# Patient Record
Sex: FEMALE | HISPANIC OR LATINO | Employment: FULL TIME | URBAN - METROPOLITAN AREA
[De-identification: names, ages, dates, MRNs, and addresses within clinical notes are randomized per-mention and may not be internally consistent; named-entity substitution may affect disease eponyms.]

---

## 2020-12-07 ENCOUNTER — TELEPHONE (OUTPATIENT)
Dept: OBSTETRICS AND GYNECOLOGY | Facility: CLINIC | Age: 20
End: 2020-12-07

## 2020-12-07 NOTE — TELEPHONE ENCOUNTER
Pt states that she has a Nexplanon in her arm, and she has been bleeding for 20 days straight.  She states that she is going to [place] in a few weeks and will not be back until January. She would like to know if she could be seen before she goes and would like to see you.  Dr. Aly Rutledge is referring physician.

## 2020-12-17 ENCOUNTER — OFFICE VISIT (OUTPATIENT)
Dept: OBSTETRICS AND GYNECOLOGY | Facility: CLINIC | Age: 20
End: 2020-12-17

## 2020-12-17 VITALS
WEIGHT: 182 LBS | HEIGHT: 65 IN | BODY MASS INDEX: 30.32 KG/M2 | DIASTOLIC BLOOD PRESSURE: 62 MMHG | SYSTOLIC BLOOD PRESSURE: 100 MMHG

## 2020-12-17 DIAGNOSIS — Z97.5 BREAKTHROUGH BLEEDING ON NEXPLANON: Primary | ICD-10-CM

## 2020-12-17 DIAGNOSIS — N92.1 BREAKTHROUGH BLEEDING ON NEXPLANON: Primary | ICD-10-CM

## 2020-12-17 PROCEDURE — 99203 OFFICE O/P NEW LOW 30 MIN: CPT | Performed by: STUDENT IN AN ORGANIZED HEALTH CARE EDUCATION/TRAINING PROGRAM

## 2020-12-17 RX ORDER — NORGESTIMATE AND ETHINYL ESTRADIOL 0.25-0.035
1 KIT ORAL DAILY
Qty: 56 TABLET | Refills: 0 | Status: SHIPPED | OUTPATIENT
Start: 2020-12-17 | End: 2021-05-20

## 2020-12-17 NOTE — PROGRESS NOTES
"Chief Complaint   Patient presents with   • Gynecologic Exam     bleeding with nexplanon         SUBJECTIVE:     Katherin Peña is a 20 y.o. No obstetric history on file. who presents with prolonged bleeding with Nexplanon in place. She reports that since placement of the Nexplanon in June or July 2019, she has been experienced prolonged bleeding with her period that last 20 days at a time every month. She reports that she most recently started last Saturday. Prior to the Nexplanon, she had regular menses every 28 days, lasting 4-6 day since menarche at age 9. She is currently sexually active with one male partner. She is about to travel to [place] for a month and would like to have treatment prior to leaving.       History reviewed. No pertinent past medical history.   History reviewed. No pertinent surgical history.   Social History     Tobacco Use   • Smoking status: Never Smoker   Substance Use Topics   • Alcohol use: Yes   • Drug use: Not Currently     OB History   No obstetric history on file.        Review of Systems   Gastrointestinal: Negative for abdominal pain, nausea and vomiting.   Genitourinary: Positive for menstrual problem and vaginal bleeding.       OBJECTIVE:   Vitals:    12/17/20 1032   BP: 100/62   Weight: 82.6 kg (182 lb)   Height: 165.1 cm (65\")        Physical Exam  Vitals signs reviewed.   Constitutional:       Appearance: Normal appearance. She is normal weight.   HENT:      Head: Normocephalic and atraumatic.      Right Ear: External ear normal.      Left Ear: External ear normal.   Eyes:      Extraocular Movements: Extraocular movements intact.      Pupils: Pupils are equal, round, and reactive to light.   Neck:      Musculoskeletal: Normal range of motion and neck supple.   Pulmonary:      Effort: Pulmonary effort is normal. No respiratory distress.   Musculoskeletal: Normal range of motion.         General: No deformity.   Skin:     General: Skin is warm and dry.   Neurological:      " General: No focal deficit present.      Mental Status: She is alert and oriented to person, place, and time.   Psychiatric:         Mood and Affect: Mood normal.         Behavior: Behavior normal.         ASSESSMENT:     ICD-10-CM ICD-9-CM   1. Breakthrough bleeding on Nexplanon  N92.1 626.6    Z97.5 V45.59       PLAN:   We discussed that breakthrough and irregular bleeding on the Nexplanon is a common side effect. We discussed options for management of her bleeding which included trial of OCPs versus removal. Patient desires a trial of OCPs. I explained that the OCPs will be used to reset the endometrial lining to hopefully prevent the prolonged bleeding the patient is experiencing. I provided the patient with 2 month supply of birth control and demonstrated use using a diagram of pills to show differences between hormonally active pills and placebo pills. Patient is to take the second pack of pills if she has a withdrawal bleed that is beyond 7 days. Will have patient follow up in 2 months for annual exam and see if bleeding has improved. I reviewed risks of oral contraceptive pills including nausea, vomiting, rash, blood clots such as a venous thromboembolism, hypertension, and in rare cases stroke.  She was counseled that irregular bleeding may occur on pills, and if prolonged, heavy or otherwise bothersome, she should contact her provider immediately.  She was counseled that she should seek immediate medical attention with any sign of VTE. She indicated understanding and all questions and concerns answered. Patient to contact clinic regarding concerning side effects.      used during encounter.        Return in about 2 months (around 2/17/2021) for Annual physical.    Mercedez Guerrero MD  12/17/2020  13:04 EST

## 2021-03-04 ENCOUNTER — OFFICE VISIT (OUTPATIENT)
Dept: OBSTETRICS AND GYNECOLOGY | Facility: CLINIC | Age: 21
End: 2021-03-04

## 2021-03-04 VITALS
SYSTOLIC BLOOD PRESSURE: 100 MMHG | WEIGHT: 196 LBS | HEIGHT: 65 IN | DIASTOLIC BLOOD PRESSURE: 66 MMHG | BODY MASS INDEX: 32.65 KG/M2

## 2021-03-04 DIAGNOSIS — Z11.3 SCREENING EXAMINATION FOR STD (SEXUALLY TRANSMITTED DISEASE): ICD-10-CM

## 2021-03-04 DIAGNOSIS — Z97.5 NEXPLANON IN PLACE: ICD-10-CM

## 2021-03-04 DIAGNOSIS — Z01.419 WELL WOMAN EXAM: Primary | ICD-10-CM

## 2021-03-04 DIAGNOSIS — Z12.4 CERVICAL CANCER SCREENING: ICD-10-CM

## 2021-03-04 PROCEDURE — 99395 PREV VISIT EST AGE 18-39: CPT | Performed by: STUDENT IN AN ORGANIZED HEALTH CARE EDUCATION/TRAINING PROGRAM

## 2021-03-04 NOTE — PROGRESS NOTES
"GYN Annual Exam     CC- Here for annual exam.     Katherin Peña is a 21 y.o. female who presents for annual well woman exam. Patient has Nexplanon in place. She has had breakthrough bleeding with the Nexplanon in December that has improved with OCP use x 2 months. No intermenstrual bleeding, spotting, or discharge. She has never underwent a pap smear or pelvic exam before.     OB History    No obstetric history on file.       2/8-2/14  Sexually active with one male.   Current contraception: Nexplanon in place since 07/2019  History of abnormal Pap smear: n/a  Family history of uterine, colon or ovarian cancer: no  History of abnormal mammogram: n/a  Family history of breast cancer: yes- paternal aunt diagnosed with breast cancer at 45.   Last Pap : n/a    History reviewed. No pertinent past medical history.    History reviewed. No pertinent surgical history.      Current Outpatient Medications:   •  norgestimate-ethinyl estradiol (ORTHO-CYCLEN) 0.25-35 MG-MCG per tablet, Take 1 tablet by mouth Daily., Disp: 56 tablet, Rfl: 0    No Known Allergies    Social History     Tobacco Use   • Smoking status: Never Smoker   Substance Use Topics   • Alcohol use: Yes   • Drug use: Not Currently       History reviewed. No pertinent family history.    Review of Systems   All other systems reviewed and are negative.      Ht 165.1 cm (65\")   Wt 88.9 kg (196 lb)   BMI 32.62 kg/m²     Physical Exam  Vitals reviewed. Exam conducted with a chaperone present.   Constitutional:       Appearance: Normal appearance. She is obese.   HENT:      Head: Normocephalic and atraumatic.      Right Ear: External ear normal.      Left Ear: External ear normal.   Eyes:      Extraocular Movements: Extraocular movements intact.      Pupils: Pupils are equal, round, and reactive to light.   Cardiovascular:      Rate and Rhythm: Normal rate and regular rhythm.   Pulmonary:      Effort: Pulmonary effort is normal. No respiratory distress.   Chest:      " Breasts: Breasts are symmetrical.         Right: Normal. No swelling, bleeding, inverted nipple, mass, nipple discharge, skin change or tenderness.         Left: Normal. No swelling, bleeding, inverted nipple, mass, nipple discharge, skin change or tenderness.   Abdominal:      General: There is no distension.      Palpations: Abdomen is soft. There is no mass.      Tenderness: There is no abdominal tenderness. There is no guarding.      Hernia: No hernia is present.   Genitourinary:     General: Normal vulva.      Exam position: Lithotomy position.      Labia:         Right: No rash, tenderness, lesion or injury.         Left: No rash, tenderness, lesion or injury.       Urethra: No prolapse or urethral swelling.      Vagina: Normal. No vaginal discharge, erythema, tenderness, bleeding or lesions.      Cervix: Normal.      Uterus: Normal. Not enlarged, not fixed and not tender.       Adnexa: Right adnexa normal and left adnexa normal.        Right: No mass, tenderness or fullness.          Left: No mass, tenderness or fullness.     Musculoskeletal:         General: No deformity. Normal range of motion.      Cervical back: Normal range of motion and neck supple.      Comments: Nexplanon palpated in left arm.    Lymphadenopathy:      Upper Body:      Right upper body: No axillary adenopathy.      Left upper body: No axillary adenopathy.      Lower Body: No right inguinal adenopathy. No left inguinal adenopathy.   Skin:     General: Skin is warm and dry.   Neurological:      General: No focal deficit present.      Mental Status: She is alert and oriented to person, place, and time.   Psychiatric:         Mood and Affect: Mood normal.         Behavior: Behavior normal.       Assessment     1) GYN annual well woman exam.   2) Cervical cancer screening   3) STD screening   4) Nexplanon in place      Plan     1) Breast Health - Clinical breast exam yearly, Self breast awareness monthly  2) Pap - Pap smear collected today.  I discussed that if this returns normal, she will need repeat pap smear in 3 years per ASCCP guidelines.    3) Smoking status- non-smoker   4) Activity recommends - Adult 150-300 min/week of multi-component physical activities that include balance training, aerobic and physical strengthening.    5) STD screening- GC/CT collected today for STD screening. Patient declined blood work.   6) Follow up prn and one year.       Mercedez Guerrero MD

## 2021-03-08 ENCOUNTER — TELEPHONE (OUTPATIENT)
Dept: OBSTETRICS AND GYNECOLOGY | Facility: CLINIC | Age: 21
End: 2021-03-08

## 2021-03-08 LAB
C TRACH RRNA CVX QL NAA+PROBE: NEGATIVE
CONV .: NORMAL
CYTOLOGIST CVX/VAG CYTO: NORMAL
CYTOLOGY CVX/VAG DOC CYTO: NORMAL
CYTOLOGY CVX/VAG DOC THIN PREP: NORMAL
DX ICD CODE: NORMAL
HIV 1 & 2 AB SER-IMP: NORMAL
N GONORRHOEA RRNA CVX QL NAA+PROBE: NEGATIVE
OTHER STN SPEC: NORMAL
STAT OF ADQ CVX/VAG CYTO-IMP: NORMAL

## 2021-03-08 NOTE — TELEPHONE ENCOUNTER
Attempted to contact patient via . Left general VM to call office.      Original message from provider - Please call this patient with a  and let her know that her pap smear returned normal and she tested negative for gonorrhea and chlamydia. Thanks!

## 2021-05-20 ENCOUNTER — PROCEDURE VISIT (OUTPATIENT)
Dept: OBSTETRICS AND GYNECOLOGY | Facility: CLINIC | Age: 21
End: 2021-05-20

## 2021-05-20 VITALS
DIASTOLIC BLOOD PRESSURE: 73 MMHG | SYSTOLIC BLOOD PRESSURE: 116 MMHG | HEIGHT: 65 IN | BODY MASS INDEX: 34.66 KG/M2 | WEIGHT: 208 LBS

## 2021-05-20 DIAGNOSIS — Z30.46 ENCOUNTER FOR NEXPLANON REMOVAL: Primary | ICD-10-CM

## 2021-05-20 PROCEDURE — 11982 REMOVE DRUG IMPLANT DEVICE: CPT | Performed by: STUDENT IN AN ORGANIZED HEALTH CARE EDUCATION/TRAINING PROGRAM

## 2021-06-02 PROBLEM — N92.1 BREAKTHROUGH BLEEDING ON NEXPLANON: Status: RESOLVED | Noted: 2020-12-17 | Resolved: 2021-06-02

## 2021-06-02 PROBLEM — Z97.5 BREAKTHROUGH BLEEDING ON NEXPLANON: Status: RESOLVED | Noted: 2020-12-17 | Resolved: 2021-06-02
